# Patient Record
Sex: FEMALE | Race: BLACK OR AFRICAN AMERICAN | NOT HISPANIC OR LATINO | URBAN - METROPOLITAN AREA
[De-identification: names, ages, dates, MRNs, and addresses within clinical notes are randomized per-mention and may not be internally consistent; named-entity substitution may affect disease eponyms.]

---

## 2019-06-16 ENCOUNTER — EMERGENCY (EMERGENCY)
Facility: HOSPITAL | Age: 64
LOS: 1 days | Discharge: HOME | End: 2019-06-16
Admitting: EMERGENCY MEDICINE
Payer: COMMERCIAL

## 2019-06-16 VITALS
HEART RATE: 79 BPM | SYSTOLIC BLOOD PRESSURE: 150 MMHG | OXYGEN SATURATION: 100 % | RESPIRATION RATE: 16 BRPM | DIASTOLIC BLOOD PRESSURE: 86 MMHG | TEMPERATURE: 99 F

## 2019-06-16 DIAGNOSIS — Z98.890 OTHER SPECIFIED POSTPROCEDURAL STATES: Chronic | ICD-10-CM

## 2019-06-16 PROBLEM — Z00.00 ENCOUNTER FOR PREVENTIVE HEALTH EXAMINATION: Status: ACTIVE | Noted: 2019-06-16

## 2019-06-16 LAB
APPEARANCE UR: CLEAR — SIGNIFICANT CHANGE UP
BILIRUB UR-MCNC: NEGATIVE — SIGNIFICANT CHANGE UP
BLOOD UR QL VISUAL: NEGATIVE — SIGNIFICANT CHANGE UP
COLOR SPEC: COLORLESS — SIGNIFICANT CHANGE UP
GLUCOSE UR-MCNC: NEGATIVE — SIGNIFICANT CHANGE UP
KETONES UR-MCNC: NEGATIVE — SIGNIFICANT CHANGE UP
LEUKOCYTE ESTERASE UR-ACNC: NEGATIVE — SIGNIFICANT CHANGE UP
NITRITE UR-MCNC: NEGATIVE — SIGNIFICANT CHANGE UP
PH UR: 8 — SIGNIFICANT CHANGE UP (ref 5–8)
PROT UR-MCNC: NEGATIVE — SIGNIFICANT CHANGE UP
SP GR SPEC: 1.01 — SIGNIFICANT CHANGE UP (ref 1–1.04)
UROBILINOGEN FLD QL: NORMAL — SIGNIFICANT CHANGE UP

## 2019-06-16 PROCEDURE — 99283 EMERGENCY DEPT VISIT LOW MDM: CPT

## 2019-06-16 RX ORDER — DIAZEPAM 5 MG
5 TABLET ORAL ONCE
Refills: 0 | Status: DISCONTINUED | OUTPATIENT
Start: 2019-06-16 | End: 2019-06-16

## 2019-06-16 RX ORDER — LIDOCAINE 4 G/100G
1 CREAM TOPICAL ONCE
Refills: 0 | Status: COMPLETED | OUTPATIENT
Start: 2019-06-16 | End: 2019-06-16

## 2019-06-16 RX ORDER — CYCLOBENZAPRINE HYDROCHLORIDE 10 MG/1
1 TABLET, FILM COATED ORAL
Qty: 9 | Refills: 0
Start: 2019-06-16 | End: 2019-06-18

## 2019-06-16 RX ADMIN — Medication 5 MILLIGRAM(S): at 10:36

## 2019-06-16 RX ADMIN — LIDOCAINE 1 PATCH: 4 CREAM TOPICAL at 10:36

## 2019-06-16 NOTE — ED PROVIDER NOTE - NSFOLLOWUPINSTRUCTIONS_ED_ALL_ED_FT
Advance activity as tolerated.  Continue all previously prescribed medications as directed unless otherwise instructed.  Take Motrin (also sold as Advil or Ibuprofen) 400-600 mg (two or three 200 mg over the counter pills) every 8 hours as needed for moderate pain or fevers-- take with food. Take Tylenol 650mg (Two 325 mg pills) every 4-6 hours as needed for pain or fevers. Take Flexeril 5 mg every 8 hours as needed for muscle spasm -- causes drowsiness; no drinking alcohol or driving with this medication.  Follow up with your primary care physician and orthopedics (referral list provided) in 48-72 hours- bring copies of your results.  Return to the ER for worsening or persistent symptoms, including but not limited to worsening/persistent pain, numbness, weakness, lightheadedness, shortness of breath and/or ANY NEW OR CONCERNING SYMPTOMS. If you have issues obtaining follow up, please call: [phone] to obtain a doctor or specialist who takes your insurance in your area.  You may call [phone] to make an appointment with the internal medicine clinic.

## 2019-06-16 NOTE — ED PROVIDER NOTE - OBJECTIVE STATEMENT
Pt is a 64 y/o F nonsmoker PMHx chronic back pain, myomectomy,  p/w back pain today. Pt states she has chronic right lower back pain radiating down right leg since , for which she takes Gabapentin, Tramadol and Naproxen.  Today while showering, pt experienced left lower, nonradiating, nonpleuritic, nonexertional back pain, 8/10 in intensity, which worsens with twisting, movement, standing.  Pt notes taking Naproxen this morning.  Pt states she has history of spinal stenosis.  Pt denies any fevers, chills, nausea, vomiting, diarrhea, constipation, melena, brbpr, chest pain, SOB, lightheadedness, calf pain/swelling, h/o dvt/pe in past, hemoptysis, h/o malignancy, recent surgeries, recent prolonged immobilization, hormonal replacement therapy, hematuria, fecal/urinary incontinence, saddle anesthesia, illicit drug use, trauma, falls, inability to walk.  Pt notes some dysuria today.

## 2019-06-16 NOTE — ED PROVIDER NOTE - PROGRESS NOTE DETAILS
LEONIDAS KELLY:  Pt notes improvement in pain.  UA negative.  Pt medically stable for discharge.  Pt to follow up with PMD and orthopedics (referral list provided).

## 2019-06-16 NOTE — ED PROVIDER NOTE - CLINICAL SUMMARY MEDICAL DECISION MAKING FREE TEXT BOX
Pt is a 62 y/o F nonsmoker PMHx chronic back pain, myomectomy,  p/w back pain today -- likely musculoskeletal back pain w/o red flags, possible UTI, not clinically concerning for aortic dissection, PE, renal colic -- pain control, ua, ucx, reassess

## 2019-06-16 NOTE — ED PROVIDER NOTE - PLAN OF CARE
Advance activity as tolerated.  Continue all previously prescribed medications as directed unless otherwise instructed.  Take Motrin (also sold as Advil or Ibuprofen) 400-600 mg (two or three 200 mg over the counter pills) every 8 hours as needed for moderate pain or fevers-- take with food. Take Tylenol 650mg (Two 325 mg pills) every 4-6 hours as needed for pain or fevers. Take Flexeril 5 mg every 8 hours as needed for muscle spasm -- causes drowsiness; no drinking alcohol or driving with this medication.  Follow up with your primary care physician and orthopedics (referral list provided) in 48-72 hours- bring copies of your results.  Return to the ER for worsening or persistent symptoms, including but not limited to worsening/persistent pain, numbness, weakness, lightheadedness, shortness of breath and/or ANY NEW OR CONCERNING SYMPTOMS. If you have issues obtaining follow up, please call: [phone] to obtain a doctor or specialist who takes your insurance in your area.  You may call [phone] to make an appointment with the internal medicine clinic.

## 2019-06-16 NOTE — ED ADULT TRIAGE NOTE - CHIEF COMPLAINT QUOTE
pt comes to ED for back pain on the left side that travels down her leg. pt has hx of spinal stenosis pt VSS pt appears comfortable. pt took naproxen for pain

## 2019-06-16 NOTE — ED PROVIDER NOTE - NONTENDER LOCATION
left costovertebral angle/right lower quadrant/periumbilical/left upper quadrant/right upper quadrant/left lower quadrant/umbilical/suprapubic/right costovertebral angle

## 2019-06-16 NOTE — ED PROVIDER NOTE - NS CPE EDP MUSC LUMBAR LOC
left sided paraspinal muscle spasm/tenderness; no redness; no warmth; no midline tenderness; no swelling; no palpable deformities

## 2019-06-17 LAB — SPECIMEN SOURCE: SIGNIFICANT CHANGE UP

## 2019-06-18 LAB — BACTERIA UR CULT: SIGNIFICANT CHANGE UP

## 2019-06-19 NOTE — ED POST DISCHARGE NOTE - RESULT SUMMARY
culture grew 3 or more types of organisms  which indicate collection contamination, consider recollection only if clinically indicated. UA: negative Patient C/O dysuria. Msg left on patient's  for return call.

## 2024-01-08 PROBLEM — M54.9 DORSALGIA, UNSPECIFIED: Chronic | Status: ACTIVE | Noted: 2019-06-16

## 2024-01-08 PROBLEM — M48.00 SPINAL STENOSIS, SITE UNSPECIFIED: Chronic | Status: ACTIVE | Noted: 2019-06-16

## 2024-02-01 ENCOUNTER — APPOINTMENT (OUTPATIENT)
Dept: VASCULAR SURGERY | Facility: CLINIC | Age: 69
End: 2024-02-01
Payer: MEDICARE

## 2024-02-01 VITALS
TEMPERATURE: 97.8 F | BODY MASS INDEX: 29.82 KG/M2 | DIASTOLIC BLOOD PRESSURE: 79 MMHG | HEART RATE: 85 BPM | WEIGHT: 179 LBS | HEIGHT: 65 IN | SYSTOLIC BLOOD PRESSURE: 115 MMHG

## 2024-02-01 DIAGNOSIS — Z78.9 OTHER SPECIFIED HEALTH STATUS: ICD-10-CM

## 2024-02-01 DIAGNOSIS — M54.16 RADICULOPATHY, LUMBAR REGION: ICD-10-CM

## 2024-02-01 PROCEDURE — 99203 OFFICE O/P NEW LOW 30 MIN: CPT

## 2024-02-01 RX ORDER — FAMOTIDINE 40 MG/1
40 TABLET, FILM COATED ORAL
Refills: 0 | Status: ACTIVE | COMMUNITY

## 2024-02-01 RX ORDER — ASPIRIN 81 MG
81 TABLET,CHEWABLE ORAL
Refills: 0 | Status: ACTIVE | COMMUNITY

## 2024-02-01 RX ORDER — LOSARTAN POTASSIUM 100 MG/1
TABLET, FILM COATED ORAL
Refills: 0 | Status: ACTIVE | COMMUNITY

## 2024-02-01 RX ORDER — ALENDRONATE SODIUM 35 MG/1
TABLET ORAL
Refills: 0 | Status: ACTIVE | COMMUNITY

## 2024-02-01 RX ORDER — LEVOTHYROXINE SODIUM 137 UG/1
TABLET ORAL
Refills: 0 | Status: ACTIVE | COMMUNITY

## 2024-04-01 PROBLEM — M25.561 RIGHT KNEE PAIN: Status: ACTIVE | Noted: 2024-04-01

## 2024-04-02 ENCOUNTER — APPOINTMENT (OUTPATIENT)
Dept: ORTHOPEDIC SURGERY | Facility: CLINIC | Age: 69
End: 2024-04-02
Payer: MEDICARE

## 2024-04-02 VITALS
HEART RATE: 108 BPM | HEIGHT: 65 IN | WEIGHT: 178 LBS | SYSTOLIC BLOOD PRESSURE: 156 MMHG | BODY MASS INDEX: 29.66 KG/M2 | DIASTOLIC BLOOD PRESSURE: 78 MMHG

## 2024-04-02 DIAGNOSIS — M17.11 UNILATERAL PRIMARY OSTEOARTHRITIS, RIGHT KNEE: ICD-10-CM

## 2024-04-02 DIAGNOSIS — M25.561 PAIN IN RIGHT KNEE: ICD-10-CM

## 2024-04-02 PROCEDURE — 73564 X-RAY EXAM KNEE 4 OR MORE: CPT | Mod: RT

## 2024-04-02 PROCEDURE — 72170 X-RAY EXAM OF PELVIS: CPT

## 2024-04-02 PROCEDURE — 99213 OFFICE O/P EST LOW 20 MIN: CPT | Mod: 25

## 2024-04-02 PROCEDURE — 20610 DRAIN/INJ JOINT/BURSA W/O US: CPT | Mod: RT

## 2024-04-07 NOTE — PHYSICAL EXAM
[de-identified] : Constitutional:  68 year old female, alert and oriented, cooperative, in no acute distress.  HEENT  NC/AT.  Appearance: symmetric  Neck/Back Straight without deformity or instability.  Good ROM.  Chest/Respiratory  Respiratory effort: no intercostal retractions or use of accessory muscles. Nonlabored Breathing  Skin  On inspection, warm and dry without rashes or lesions.  Mental Status:  Judgment, insight: intact Orientation: oriented to time, place, and person  Neurological: Sensory and Motor are grossly intact throughout  Right Knee  Inspection:     Skin intact, no rashes or lesions     No Effusion     Non-tender to palpation over tibial tubercle, patella, medial and lateral joint line, and pes insertion.  Range of Motion: 	Extension - 0 degrees 	Flexion - 120 degrees 	Extensor lag: None  Stability:      Demonstrates no Varus or Valgus instability      Negative Anterior or Posterior drawer.      Negative Lachman's  Patella: stable, tracks well.   Neurologic Exam     Motor intact including 5/5 Extensor Hallucis Longus, 5/5 Flexor Hallucis Longus, 5/5 Tibialis Anterior and 5/5 Gastrocnemius     Sensation Intact to Light Touch including Saphenous, Sural, Superficial Peroneal, Deep Peroneal, Tibial nerve distributions  Vascular Exam     Foot is warm and well perfused with 2+ Dorsalis Pedis Pulse   No pain with range of motion of the bilateral hips or left knee. There is lumbar paraspinal muscle tenderness.  [de-identified] : XRay:  XRays of the Pelvis (1 View) taken in the office today and discussed with the patient. XRays demonstrate no obvious fracture or dislocation. There is no significant evidence of osteoarthritis or osteophyte formation. There is a lumbar fusion partially visualized. (my personal interpretation).   XRay: XRays of the Right Knee (4 Views) taken in the office today and reviewed with the patient. XRays demonstrate tricompartmental joint space narrowing, worse in the medial compartment, with subchondral sclerosis, overlying osteophytes, all consistent with severe osteoarthritis, KL ndGndrndanddndend:nd nd2nd. There is varus alignment. (my personal interpretation)

## 2024-04-07 NOTE — HISTORY OF PRESENT ILLNESS
[de-identified] : Julissa Gtz is a 68 year old female who presented to the office for evaluation of her right knee pain. Patient has been experiencing right knee pain for about 1.5 months. Patient reports pain, numbness, tingling, and buckling of the knee. She can have knee crepitus. Pain is worse with knee flexion, walking and stairs. She has tried Tylenol. She has not tried physical therapy or injections. She has a history of a prior lumbar surgery in 2019.  History: HTN, HLD

## 2024-04-07 NOTE — DISCUSSION/SUMMARY
[de-identified] : Julissa Gtz is a 68 year old female who presented to the office for evaluation of her right knee pain. Patient has been experiencing right knee pain for about 1.5 months. Discussed with the patient the examination and imaging findings.  Discussed with the patient the operative and nonoperative management of knee osteoarthritis, including total knee arthroplasty. Patient would like to continue nonoperative management at this time.  Discussed the nonoperative management of knee osteoarthritis, including physical therapy, anti-inflammatories, and injections.  Patient was given referral for physical therapy.  Patient will take over the counter anti-inflammatories as needed for pain control. Patient would like a knee corticosteroid injection today.  Discussed the risks of corticosteroid injections.  Patient was given a Right knee corticosteroid injection using aseptic technique.  Patient tolerated the procedure well.  Patient will follow-up in 3 months for reevaluation and management.  Patient understanding and in agreement the plan.  All questions answered.  Plan: -Right Knee Corticosteroid Injection Given -Physical Therapy -Over the counter anti-inflammatories as needed for pain control -Follow up in 3 months for reevaluation and management  Procedure Note: Right knee corticosteroid injection  A Right Knee corticosteroid injection was given today. Risk and benefits of the injection were discussed, including but not limited to infection, fat atrophy, skin discoloration, failure to achieve desired results and elevated blood sugars.  The area was prepped in the usual sterile fashion. The injection was given with 1 cc (40 mg) Methylprednisolone and 4 cc 1% Lidocaine and the patient tolerated it well.   Risk of cortisone injection are minimal but present. There is the rare risk of joint infection, skin and soft tissue thinning or whitening of the skin surrounding the injection site, thinning of nearby bone, or the risk of elevated blood glucose in diabetics. Diabetics receiving steroid injection should follow their blood sugars very closely for several days after receiving the injections.  In the event of uncontrolled elevated blood glucose, they should seek medical attention.  There's some concern that repeated use of cortisone shots may cause deterioration of the cartilage within a joint. For this reason, doctors typically limit the number of cortisone shots in a joint. The limit varies depending on the joint and the reason for treatment.  Cortisone shots usually include a corticosteroid medication and a local anesthetic. The local anesthetic helps to numb the joint at the time of the injection and last for several hours. The cortisone acts to relieve pain and inflammation but may take several days to begin to work. Some people do experience a cortisone flare after the injection and may have increased pain for 2 to 3 days after the injection, and then pain can begin to improve.  Patient was instructed to call or return for any signs or symptoms of infection after an injection including increased pain, swelling, redness or fevers.

## 2024-07-02 ENCOUNTER — APPOINTMENT (OUTPATIENT)
Dept: ORTHOPEDIC SURGERY | Facility: CLINIC | Age: 69
End: 2024-07-02

## 2024-07-02 DIAGNOSIS — M17.11 UNILATERAL PRIMARY OSTEOARTHRITIS, RIGHT KNEE: ICD-10-CM

## 2024-07-02 PROCEDURE — 99214 OFFICE O/P EST MOD 30 MIN: CPT | Mod: 25

## 2024-07-02 PROCEDURE — 20610 DRAIN/INJ JOINT/BURSA W/O US: CPT | Mod: RT

## 2024-09-29 NOTE — PHYSICAL EXAM
[de-identified] : Constitutional:  68 year old female, alert and oriented, cooperative, in no acute distress.  HEENT  NC/AT.  Appearance: symmetric  Neck/Back Straight without deformity or instability.  Good ROM.  Chest/Respiratory  Respiratory effort: no intercostal retractions or use of accessory muscles. Nonlabored Breathing  Skin  On inspection, warm and dry without rashes or lesions.  Mental Status:  Judgment, insight: intact Orientation: oriented to time, place, and person  Neurological: Sensory and Motor are grossly intact throughout  Right Knee  Inspection:     Skin intact, no rashes or lesions     No Effusion     Non-tender to palpation over tibial tubercle, patella, medial and lateral joint line, and pes insertion.  Range of Motion: 	Extension - 0 degrees 	Flexion - 120 degrees 	Extensor lag: None  Stability:      Demonstrates no Varus or Valgus instability      Negative Anterior or Posterior drawer.      Negative Lachman's  Patella: stable, tracks well.   Neurologic Exam     Motor intact including 5/5 Extensor Hallucis Longus, 5/5 Flexor Hallucis Longus, 5/5 Tibialis Anterior and 5/5 Gastrocnemius     Sensation Intact to Light Touch including Saphenous, Sural, Superficial Peroneal, Deep Peroneal, Tibial nerve distributions  Vascular Exam     Foot is warm and well perfused with 2+ Dorsalis Pedis Pulse   No pain with range of motion of the bilateral hips or left knee. There is lumbar paraspinal muscle tenderness.  [de-identified] : XRay:  XRays of the Pelvis (1 View) taken on 4/2/2024. XRays demonstrate no obvious fracture or dislocation. There is no significant evidence of osteoarthritis or osteophyte formation. There is a lumbar fusion partially visualized. (my personal interpretation).   XRay: XRays of the Right Knee (4 Views) taken on 4/2/2024. XRays demonstrate tricompartmental joint space narrowing, worse in the medial compartment, with subchondral sclerosis, overlying osteophytes, all consistent with severe osteoarthritis, KL ndGndrndanddndend:nd nd2nd. There is varus alignment. (my personal interpretation)

## 2024-09-29 NOTE — DISCUSSION/SUMMARY
[de-identified] : Julissa Gtz is a 68 year old female who presented to the office for follow up of her right knee pain. Patient had a recurrence of her knee pain. Prior x-rays showed right knee osteoarthritis.  Examination showed good right knee range of motion. Discussed with the patient the examination and imaging findings.  Discussed with the patient the operative and nonoperative management of knee osteoarthritis, including total knee arthroplasty. Patient would like to continue nonoperative management at this time.  Discussed the nonoperative management of knee osteoarthritis, including physical therapy, anti-inflammatories, and injections.  Patient was given a referral for physical therapy.  Patient will take over the counter anti-inflammatories as needed for pain control. Patient would like a knee corticosteroid injection today.  Discussed the risks of corticosteroid injections.  Patient was given a Right knee corticosteroid injection using aseptic technique.  Patient tolerated the procedure well.  Patient will follow-up in 3 months for reevaluation and management.  Patient understanding and in agreement the plan.  All questions answered.  Plan: -Right Knee Corticosteroid Injection Given -Physical Therapy -Over the counter anti-inflammatories as needed for pain control -Follow up in 3 months for reevaluation and management  Procedure Note: Right knee corticosteroid injection  A Right Knee corticosteroid injection was given today. Risk and benefits of the injection were discussed, including but not limited to infection, fat atrophy, skin discoloration, failure to achieve desired results and elevated blood sugars.  The area was prepped in the usual sterile fashion. The injection was given with 1 cc (40 mg) Methylprednisolone and 4 cc 1% Lidocaine and the patient tolerated it well.   Risk of cortisone injection are minimal but present. There is the rare risk of joint infection, skin and soft tissue thinning or whitening of the skin surrounding the injection site, thinning of nearby bone, or the risk of elevated blood glucose in diabetics. Diabetics receiving steroid injection should follow their blood sugars very closely for several days after receiving the injections.  In the event of uncontrolled elevated blood glucose, they should seek medical attention.  There's some concern that repeated use of cortisone shots may cause deterioration of the cartilage within a joint. For this reason, doctors typically limit the number of cortisone shots in a joint. The limit varies depending on the joint and the reason for treatment.  Cortisone shots usually include a corticosteroid medication and a local anesthetic. The local anesthetic helps to numb the joint at the time of the injection and last for several hours. The cortisone acts to relieve pain and inflammation but may take several days to begin to work. Some people do experience a cortisone flare after the injection and may have increased pain for 2 to 3 days after the injection, and then pain can begin to improve.  Patient was instructed to call or return for any signs or symptoms of infection after an injection including increased pain, swelling, redness or fevers.

## 2024-09-29 NOTE — HISTORY OF PRESENT ILLNESS
[de-identified] : 10/3/2024   7/2/2024  Julissa Gtz presents to the office for follow-up of her right knee pain.  Patient was doing okay and then she went to [place] on Saturday.  She then had knee pain.  Pain is located over the medial knee.  She is taking her gabapentin.  Her prior injection helped.  No falls.  No fevers or chills.  4/2/2024 Julissa Gtz is a 68 year old female who presented to the office for evaluation of her right knee pain. Patient has been experiencing right knee pain for about 1.5 months. Patient reports pain, numbness, tingling, and buckling of the knee. She can have knee crepitus. Pain is worse with knee flexion, walking and stairs. She has tried Tylenol. She has not tried physical therapy or injections. She has a history of a prior lumbar surgery in 2019.  History: HTN, HLD

## 2024-10-03 ENCOUNTER — APPOINTMENT (OUTPATIENT)
Dept: ORTHOPEDIC SURGERY | Facility: CLINIC | Age: 69
End: 2024-10-03

## 2025-02-13 ENCOUNTER — APPOINTMENT (OUTPATIENT)
Dept: ORTHOPEDIC SURGERY | Facility: CLINIC | Age: 70
End: 2025-02-13

## 2025-02-13 DIAGNOSIS — M25.562 PAIN IN RIGHT KNEE: ICD-10-CM

## 2025-02-13 DIAGNOSIS — M25.561 PAIN IN RIGHT KNEE: ICD-10-CM

## 2025-02-13 PROCEDURE — 20610 DRAIN/INJ JOINT/BURSA W/O US: CPT | Mod: LT

## 2025-02-13 PROCEDURE — 99214 OFFICE O/P EST MOD 30 MIN: CPT | Mod: 25

## 2025-05-13 ENCOUNTER — APPOINTMENT (OUTPATIENT)
Dept: ORTHOPEDIC SURGERY | Facility: CLINIC | Age: 70
End: 2025-05-13